# Patient Record
Sex: MALE | Race: WHITE
[De-identification: names, ages, dates, MRNs, and addresses within clinical notes are randomized per-mention and may not be internally consistent; named-entity substitution may affect disease eponyms.]

---

## 2017-06-28 ENCOUNTER — HOSPITAL ENCOUNTER (EMERGENCY)
Dept: HOSPITAL 62 - ER | Age: 57
Discharge: HOME | End: 2017-06-28
Payer: OTHER GOVERNMENT

## 2017-06-28 VITALS — DIASTOLIC BLOOD PRESSURE: 83 MMHG | SYSTOLIC BLOOD PRESSURE: 146 MMHG

## 2017-06-28 DIAGNOSIS — R32: ICD-10-CM

## 2017-06-28 DIAGNOSIS — G89.29: ICD-10-CM

## 2017-06-28 DIAGNOSIS — I10: ICD-10-CM

## 2017-06-28 DIAGNOSIS — M54.5: Primary | ICD-10-CM

## 2017-06-28 LAB
ALBUMIN SERPL-MCNC: 4 G/DL (ref 3.5–5)
ALP SERPL-CCNC: 78 U/L (ref 38–126)
ALT SERPL-CCNC: 21 U/L (ref 21–72)
ANION GAP SERPL CALC-SCNC: 13 MMOL/L (ref 5–19)
APPEARANCE UR: (no result)
AST SERPL-CCNC: 26 U/L (ref 17–59)
BASOPHILS # BLD AUTO: 0.1 10^3/UL (ref 0–0.2)
BASOPHILS NFR BLD AUTO: 0.9 % (ref 0–2)
BILIRUB DIRECT SERPL-MCNC: 0.4 MG/DL (ref 0–0.4)
BILIRUB SERPL-MCNC: 0.9 MG/DL (ref 0.2–1.3)
BILIRUB UR QL STRIP: NEGATIVE
BUN SERPL-MCNC: 15 MG/DL (ref 7–20)
CALCIUM: 9.3 MG/DL (ref 8.4–10.2)
CHLORIDE SERPL-SCNC: 105 MMOL/L (ref 98–107)
CO2 SERPL-SCNC: 24 MMOL/L (ref 22–30)
CREAT SERPL-MCNC: 1.24 MG/DL (ref 0.52–1.25)
EOSINOPHIL # BLD AUTO: 0.1 10^3/UL (ref 0–0.6)
EOSINOPHIL NFR BLD AUTO: 1 % (ref 0–6)
ERYTHROCYTE [DISTWIDTH] IN BLOOD BY AUTOMATED COUNT: 14.4 % (ref 11.5–14)
GLUCOSE SERPL-MCNC: 141 MG/DL (ref 75–110)
GLUCOSE UR STRIP-MCNC: NEGATIVE MG/DL
HCT VFR BLD CALC: 44.5 % (ref 37.9–51)
HGB BLD-MCNC: 15.5 G/DL (ref 13.5–17)
HGB HCT DIFFERENCE: 2
KETONES UR STRIP-MCNC: NEGATIVE MG/DL
LIPASE SERPL-CCNC: 64.4 U/L (ref 23–300)
LYMPHOCYTES # BLD AUTO: 1 10^3/UL (ref 0.5–4.7)
LYMPHOCYTES NFR BLD AUTO: 13.4 % (ref 13–45)
MCH RBC QN AUTO: 30 PG (ref 27–33.4)
MCHC RBC AUTO-ENTMCNC: 34.8 G/DL (ref 32–36)
MCV RBC AUTO: 86 FL (ref 80–97)
MONOCYTES # BLD AUTO: 0.5 10^3/UL (ref 0.1–1.4)
MONOCYTES NFR BLD AUTO: 7 % (ref 3–13)
NEUTROPHILS # BLD AUTO: 5.7 10^3/UL (ref 1.7–8.2)
NEUTS SEG NFR BLD AUTO: 77.7 % (ref 42–78)
NITRITE UR QL STRIP: NEGATIVE
PH UR STRIP: 5 [PH] (ref 5–9)
POTASSIUM SERPL-SCNC: 3.9 MMOL/L (ref 3.6–5)
PROT SERPL-MCNC: 6.4 G/DL (ref 6.3–8.2)
PROT UR STRIP-MCNC: NEGATIVE MG/DL
RBC # BLD AUTO: 5.17 10^6/UL (ref 4.35–5.55)
SODIUM SERPL-SCNC: 141.9 MMOL/L (ref 137–145)
SP GR UR STRIP: 1.02
UROBILINOGEN UR-MCNC: NEGATIVE MG/DL (ref ?–2)
WBC # BLD AUTO: 7.3 10^3/UL (ref 4–10.5)

## 2017-06-28 PROCEDURE — 80053 COMPREHEN METABOLIC PANEL: CPT

## 2017-06-28 PROCEDURE — 51702 INSERT TEMP BLADDER CATH: CPT

## 2017-06-28 PROCEDURE — 72148 MRI LUMBAR SPINE W/O DYE: CPT

## 2017-06-28 PROCEDURE — 87086 URINE CULTURE/COLONY COUNT: CPT

## 2017-06-28 PROCEDURE — 81001 URINALYSIS AUTO W/SCOPE: CPT

## 2017-06-28 PROCEDURE — 36415 COLL VENOUS BLD VENIPUNCTURE: CPT

## 2017-06-28 PROCEDURE — 99284 EMERGENCY DEPT VISIT MOD MDM: CPT

## 2017-06-28 PROCEDURE — 83690 ASSAY OF LIPASE: CPT

## 2017-06-28 PROCEDURE — 85025 COMPLETE CBC W/AUTO DIFF WBC: CPT

## 2017-06-28 NOTE — ER DOCUMENT REPORT
ED General





- General


Chief Complaint: Low Back Pain


Stated Complaint: BACK PAIN


Time Seen by Provider: 06/28/17 15:33


Notes: 


Patient says that he has been feeling weak for the past month.  Also having 

difficulty controlling his urine and "peeing on myself" over that same time.  

And, the patient also has lower back pain.  All of his symptoms have been 

present for about the past month, but have gotten worse in the past few days.  

Patient says he is here today because he is unable to stand and walk normally 

and has not been able to control his urine at all for the past few days.  Does 

not have any numbness in his lower extremities.  Does not have any problems 

with bowel movements.  Has never been told he has any prostate condition.  

Denies any UTI symptoms except for some occasional frequency at nighttime.  Has 

not had any blood in his urine or discomfort urinating.  Patient is not aware 

of any fever.





Patient gets annual upper endoscopies because he says he has "pre-advanced 

cancer" of the throat, but thus far has not required any treatment, just annual 

evaluations.  Also has hypertension.  Denies diabetes.  Patient has patient's 

medications for depression and anxiety.





Patient has chronic low back pain for which she is [facility] 

[facility]


TRAVEL OUTSIDE OF THE [place] IN LAST 30 DAYS: No





- Related Data


Allergies/Adverse Reactions: 


 





zolpidem [From Ambien] Adverse Reaction (Mild, Verified 10/17/16 16:44)


 











Past Medical History





- Social History


Smoking Status: Never Smoker


Chew tobacco use (# tins/day): No


Frequency of alcohol use: None


Drug Abuse: None


Family History: Reviewed & Not Pertinent





- Past Medical History


Cardiac Medical History: Reports: Hx Hypercholesterolemia, Hx Hypertension


Pulmonary Medical History: Reports: Hx Asthma


Psychiatric Medical History: Reports: Hx Anxiety, Hx Depression


Past Surgical History: Reports: Hx Oral Surgery, Hx Orthopedic Surgery - OCL 

repair





- Immunizations


Hx Diphtheria, Pertussis, Tetanus Vaccination: Yes





Review of Systems





- Review of Systems


Notes: 


REVIEW OF SYSTEMS:


CONSTITUTIONAL :  Denies fever.  Feels weak all over, especially in his lower 

extremities.


EENT:   Denies eye, ear, nose or mouth or throat pain or other symptoms.


CARDIOVASCULAR:  Denies chest pain.


RESPIRATORY:  Denies cough, chest congestion, or shortness of breath.


GASTROINTESTINAL:  Denies abdominal pain or nausea, vomiting, or diarrhea.  No 

problems controlling bowel movements.


GENITOURINARY: See HPI.


MUSCULOSKELETAL:  Denies neck pain but has some lower lumbar back pain, 

bilateral..  Denies joint pain or swelling.


SKIN:   Denies rash or skin lesions.


NEUROLOGICAL:  Denies LOC or altered mental status.  Denies headache.  Denies 

sensory loss, no numbness lower extremities. 





ALL OTHER SYSTEMS REVIEWED AND NEGATIVE.





Physical Exam





- Vital signs


Vitals: 


 











Temp Pulse Resp BP Pulse Ox


 


 98.3 F   102 H  16   146/82 H  95 


 


 06/28/17 15:25  06/28/17 15:25  06/28/17 15:25  06/28/17 15:25  06/28/17 15:25











Interpretation: Normal





- Notes


Notes: 


PHYSICAL EXAMINATION:





GENERAL: Well-appearing, in no acute distress.  Ambulatory, although slow and 

deliberate.





HEAD: Atraumatic, normocephalic.





ENT: oropharynx clear without exudates.  Moist mucous membranes.





NECK: Normal range of motion, supple.





LUNGS: Breath sounds clear and equal bilaterally.





HEART: Regular rate and rhythm without murmurs.





ABDOMEN: Soft, nontender.  No guarding or rebound.  No masses felt.  No bruits 

heard.


     Patient's initial Oconnell catheter produced about 50 mL of clean-looking 

urine.  He now has about 70 mL in the bag and tubing so he is making an 

appropriate amount of urine and no evidence of infection or urinary retention.





BACK: Mild paralumbar muscle tenderness, but otherwise no tenderness throughout 

entire remaining back.





EXTREMITIES: Normal range of motion without pain.





NEUROLOGICAL:  Normal speech, normal gait although slow and deliberate.  Can 

maintain his balance, make a turn and get back in the stretcher.  Reflexes +1 

bilateral and symmetrical awake, alert, and oriented x3.  Cranial nerves normal.





PSYCH: Normal mood, normal affect.





SKIN: Warm, dry, no rashes.





Course





- Re-evaluation


Re-evalutation: 





06/28/17 19:53


Labs are all essentially normal.  No evidence of a urine infection.  Patient 

had a Oconnell catheter placed and had a small amount of urine in his bladder.  

Urine looks clean.  Urinalysis is normal.





- Vital Signs


Vital signs: 


 











Temp Pulse Resp BP Pulse Ox


 


 97.3 F   92   19   146/83 H  94 


 


 06/28/17 22:09  06/28/17 22:09  06/28/17 22:09  06/28/17 22:09  06/28/17 22:09














- Laboratory


Result Diagrams: 


 06/28/17 16:17





 06/28/17 16:17


Laboratory results interpreted by me: 


 











  06/28/17 06/28/17





  16:17 16:17


 


RDW  14.4 H 


 


Glucose   141 H














- Diagnostic Test


Radiology reviewed: Image reviewed, Reports reviewed - MRI of the lumbar spine 

shows degenerative and arthritic changes.  All of these findings are of a mild 

to moderate degree.  None are severe.  Patient has no evidence of spinal 

stenosis.





Discharge





- Discharge


Clinical Impression: 


Back pain


Qualifiers:


 Back pain location: low back pain Chronicity: unspecified Back pain laterality

: bilateral Sciatica presence: without sciatica Qualified Code(s): M54.5 - Low 

back pain





Urinary incontinence


Qualifiers:


 Urinary Incontinence type: unspecified incontinence Qualified Code(s): R32 - 

Unspecified urinary incontinence





Condition: Stable


Disposition: HOME, SELF-CARE


Additional Instructions: 


OW BACK PAIN:


     Three out of every four people will have an episode of disabling back pain 

during their lifetime.  Most commonly the pain is due to straining of the 

muscles and ligaments in the low back.


     Usual treatment includes: 


(1) Rest on a firm surface.  Avoid lying on your stomach.  


(2) Ice pack the painful area.  After a few days, gentle heat may be used 

intermittently to relax the area, or ice packs can be continued.  


(3) Medication may be needed -- muscle relaxers and antiinflammatory medicines 

are commonly used.  


(4) As the back improves, exercises are prescribed to strengthen the back and 

abdominal muscles.


     Your doctor will advise you on the proper care for your back at each stage 

in your recovery.  You may be better in a few days -- or healing may take 

several weeks.


     If new symptoms of a "herniated disc" (radiation of pain, numbness, or 

tingling down the back of the leg or weakness in the leg) occur, you should be 

re-examined.  Further testing may be necessary.





WARM PACKS:


     After approximately two days, apply gentle heat (such as a heating pad or 

hot water bottle) for about 20 to 30 minutes about every two hours -- at least 

four times daily.  Warmth and elevation will help you make a more rapid recovery

, and will ease the pain considerably.


     Do not use HOT heat, and never apply heat for longer than 30 minutes.  The 

continuous heat can invisibly damage skin and muscles -- even when no burn is 

seen on the surface.  Damaged muscles can make you MORE sore.





USE OF ACETAMINOPHEN (Tylenol):


     Acetaminophen may be taken for pain relief or fever control. It's much 

safer than aspirin, offering a wider range of "safe" dosages.  It is safe 

during pregnancy.  Some brand names are Tylenol, Panadol, Datril, Anacin 3, 

Tempra, and Liquiprin. Acetaminophen can be repeated every four hours.  The 

following are maximum recommended dosages:





WEIGHT         Dose             Drops                  Elixir        Chewable(

80mg)


(LBS.)                            drprs=droppers    tsp=teaspoon


>89 pounds or adults          650 mg to 900 mg





Acetaminophen can be repeated every four hours.  Maximum dose not to exceed 

4000 mg a day.





   These maximum recommended dosages are slightly higher than the dosages 

written on the product container, but these dosages are very safe and below the 

toxic dosage for acetaminophen.





Urinary Incontinence


     Urinary incontinence is unexpected leakage of urine from the bladder. It 

can be caused by damaged or weak pelvic muscles (such as after childbirth), 

bladder inflammation, weak bladder sphincter (valve), medications, prostate 

problems, and nerve problems. This is a common problem, especially in our older 

patients. 


     Treatment of incontinence depends on the severity, and on the underlying 

cause. We test for urinary tract infection and examine for prostate enlargement 

or prolapse (sagging down) of the bladder and uterus. If we suspect a medicine 

may be contributing to the problem, we may stop it or lower the dosage.


     If the problem can't be corrected with medication or surgery, you may need 

to use absorbent underwear. For men, a "condom catheter" over the penis can 

collect urine. It's sometimes necessary to keep a catheter inside the bladder.


     The following are different types of urinary incontinence.


     Stress incontinence: A sudden burst of urine with cough or sneeze. This is 

common in women with multiple children. Pelvic muscle (Kegel) exercises may 

help. An incontinence pad catches the occasional accident. If severe, an 

operation to suspend of the bladder may correct the problem.


     Overflow incontinence: Leakage from an over-filled bladder. This type of 

incontinence is usually caused by an enlarged prostate or narrowed urethra. 

Prostatic obstruction can be treated with medication. Severe cases may require 

prostate surgery.


     Neurogenic bladder: Sudden emptying of the bladder due to injury or 

disease of the nerves that control the bladder, or overflow caused by lack of 

bladder contraction (atonic bladder). This usually requires a catheter, or the 

wearing of incontinence undergarments. A weak (atonic) bladder sometimes 

responds to medicine such as bethanechol (Urecholine).


     Overactive bladder: The bladder is irritable and tightens when it's not 

full. The sudden urge to urinate makes it hard to avoid passage of urine. This 

can often be treated with medication such as oxybutynin.


     Sphincter atony: The muscle that holds urine in the bladder is weak. This 

often causes incontinence at night. Medication such as imipramine or 

psuedoephedrine can sometimes help.





FOLLOW-UP CARE:


If you have been referred to a physician for follow-up care, call the physician

s office for an appointment as you were instructed or within the next two days.

  If you experience worsening or a significant change in your symptoms, notify 

the physician immediately or return to the Emergency Department at any time for 

re-evaluation.





[facility] the [facility] for a referral to a urologist if you continue to 

have your symptoms of poor bladder control.

## 2017-06-28 NOTE — RADIOLOGY REPORT (SQ)
EXAM DESCRIPTION:  MRI LUMBAR SPINE WITHOUT



COMPLETED DATE/TIME:  6/28/2017 9:38 pm



REASON FOR STUDY:  Urinary incontinence and weakness in legs



COMPARISON:  None.



TECHNIQUE:  Sagittal and Axial imaging includes T1, T2, STIR and gradient echo sequences. Coronal T2/
HASTE imaging.



LIMITATIONS:  None.



FINDINGS:  VISUALIZED UPPER ABDOMEN:  Limited evaluation. No acute or suspicious findings suggested.

SEGMENTATION: No transitional anatomy. The lowest well-developed disc space is labeled L5-S1.

ALIGNMENT: Anatomic.

VERTEBRAE: Intact.

BONE MARROW: Normal. No marrow replacement or reactive changes.

DISC SIGNAL: Loss of height T2 signal L1- 2, L2-3, L5-S1.  Loss of T2 signal L4-5.

POSTERIOR ELEMENTS:  Generally intact.  No pars defect evident.

HARDWARE: None in the spine.

CORD AND CONUS: Normal in size and signal intensity. Conus at the appropriate level.

SOFT TISSUES: No aortic aneurysm seen. No bulky retroperitoneal adenopathy or mass. No paraspinal mas
s or fluid.

L1-L2: Disc bulge asymmetric left with mild narrowing of the left exit foramina.

L2-L3: Generalized disc bulge with mild narrowing of the exit foramina.

L3-L4: Generalized disc bulge with mild narrowing of the exit foramina.

L4-L5: Central disc protrusion.  Mild facet ligamentous hypertrophy.  Moderate narrowing of the left 
exit foramina and moderate central canal stenosis.  Mild narrowing of the left exit foramina.

L5-S1: Generalized disc bulge with central protrusion.  Moderate narrowing of the exit foramina.

LOWER THORACIC: Incompletely imaged.  No stenosis seen.

SACRUM: Visualized upper sacrum intact.

OTHER: No other significant findings.



IMPRESSION:  Multilevel spondylosis.  At L4-5 there is moderate narrowing of the left exit foramina a
nd moderate central canal stenosis.  At L5-S1 there is moderate of the exit foramina.



TECHNICAL DOCUMENTATION:  JOB ID:  [id number]

 [employer]- All Rights Reserved

## 2019-03-28 ENCOUNTER — HOSPITAL ENCOUNTER (OUTPATIENT)
Dept: HOSPITAL 62 - ER | Age: 59
Setting detail: OBSERVATION
LOS: 2 days | Discharge: HOME | End: 2019-03-30
Attending: INTERNAL MEDICINE | Admitting: INTERNAL MEDICINE
Payer: OTHER GOVERNMENT

## 2019-03-28 DIAGNOSIS — R42: ICD-10-CM

## 2019-03-28 DIAGNOSIS — J45.909: ICD-10-CM

## 2019-03-28 DIAGNOSIS — F41.9: ICD-10-CM
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
## 2019-03-28 DIAGNOSIS — R55: Primary | ICD-10-CM

## 2019-03-28 DIAGNOSIS — Z79.82: ICD-10-CM

## 2019-03-28 DIAGNOSIS — F32.9: ICD-10-CM

## 2019-03-28 DIAGNOSIS — Z79.899: ICD-10-CM

## 2019-03-28 DIAGNOSIS — S01.81XA: ICD-10-CM

## 2019-03-28 DIAGNOSIS — E78.5: ICD-10-CM

## 2019-03-28 DIAGNOSIS — Z87.898: ICD-10-CM

## 2019-03-28 LAB
ADD MANUAL DIFF: NO
ALBUMIN SERPL-MCNC: 4.3 G/DL (ref 3.5–5)
ALP SERPL-CCNC: 180 U/L (ref 38–126)
ALT SERPL-CCNC: 27 U/L (ref 21–72)
ANION GAP SERPL CALC-SCNC: 16 MMOL/L (ref 5–19)
AST SERPL-CCNC: 32 U/L (ref 17–59)
BARBITURATES UR QL SCN: NEGATIVE
BASOPHILS # BLD AUTO: 0.1 10^3/UL (ref 0–0.2)
BASOPHILS NFR BLD AUTO: 1.1 % (ref 0–2)
BILIRUB DIRECT SERPL-MCNC: 0.5 MG/DL (ref 0–0.4)
BILIRUB SERPL-MCNC: 1 MG/DL (ref 0.2–1.3)
BUN SERPL-MCNC: 10 MG/DL (ref 7–20)
CALCIUM: 9.9 MG/DL (ref 8.4–10.2)
CHLORIDE SERPL-SCNC: 101 MMOL/L (ref 98–107)
CK MB SERPL-MCNC: 3.25 NG/ML (ref ?–4.55)
CK SERPL-CCNC: 115 U/L (ref 55–170)
CO2 SERPL-SCNC: 20 MMOL/L (ref 22–30)
EOSINOPHIL # BLD AUTO: 0 10^3/UL (ref 0–0.6)
EOSINOPHIL NFR BLD AUTO: 0.3 % (ref 0–6)
ERYTHROCYTE [DISTWIDTH] IN BLOOD BY AUTOMATED COUNT: 14 % (ref 11.5–14)
GLUCOSE SERPL-MCNC: 93 MG/DL (ref 75–110)
HCT VFR BLD CALC: 51.1 % (ref 37.9–51)
HGB BLD-MCNC: 17.7 G/DL (ref 13.5–17)
INR PPP: 0.99
LYMPHOCYTES # BLD AUTO: 1.1 10^3/UL (ref 0.5–4.7)
LYMPHOCYTES NFR BLD AUTO: 12.3 % (ref 13–45)
MCH RBC QN AUTO: 29.2 PG (ref 27–33.4)
MCHC RBC AUTO-ENTMCNC: 34.6 G/DL (ref 32–36)
MCV RBC AUTO: 84 FL (ref 80–97)
METHADONE UR QL SCN: NEGATIVE
MONOCYTES # BLD AUTO: 0.6 10^3/UL (ref 0.1–1.4)
MONOCYTES NFR BLD AUTO: 6.7 % (ref 3–13)
NEUTROPHILS # BLD AUTO: 7 10^3/UL (ref 1.7–8.2)
NEUTS SEG NFR BLD AUTO: 79.6 % (ref 42–78)
PCP UR QL SCN: NEGATIVE
PLATELET # BLD: 325 10^3/UL (ref 150–450)
POTASSIUM SERPL-SCNC: 3.5 MMOL/L (ref 3.6–5)
PROT SERPL-MCNC: 7.2 G/DL (ref 6.3–8.2)
PROTHROMBIN TIME: 13.6 SEC (ref 11.4–15.4)
RBC # BLD AUTO: 6.06 10^6/UL (ref 4.35–5.55)
SODIUM SERPL-SCNC: 136.9 MMOL/L (ref 137–145)
TOTAL CELLS COUNTED % (AUTO): 100 %
TROPONIN I SERPL-MCNC: < 0.012 NG/ML
URINE AMPHETAMINES SCREEN: NEGATIVE
URINE BENZODIAZEPINES SCREEN: NEGATIVE
URINE COCAINE SCREEN: NEGATIVE
URINE MARIJUANA (THC) SCREEN: NEGATIVE
WBC # BLD AUTO: 8.8 10^3/UL (ref 4–10.5)

## 2019-03-28 PROCEDURE — 80053 COMPREHEN METABOLIC PANEL: CPT

## 2019-03-28 PROCEDURE — 93010 ELECTROCARDIOGRAM REPORT: CPT

## 2019-03-28 PROCEDURE — 82553 CREATINE MB FRACTION: CPT

## 2019-03-28 PROCEDURE — 84484 ASSAY OF TROPONIN QUANT: CPT

## 2019-03-28 PROCEDURE — 93005 ELECTROCARDIOGRAM TRACING: CPT

## 2019-03-28 PROCEDURE — 85025 COMPLETE CBC W/AUTO DIFF WBC: CPT

## 2019-03-28 PROCEDURE — 82550 ASSAY OF CK (CPK): CPT

## 2019-03-28 PROCEDURE — 72125 CT NECK SPINE W/O DYE: CPT

## 2019-03-28 PROCEDURE — 85610 PROTHROMBIN TIME: CPT

## 2019-03-28 PROCEDURE — 82962 GLUCOSE BLOOD TEST: CPT

## 2019-03-28 PROCEDURE — G0378 HOSPITAL OBSERVATION PER HR: HCPCS

## 2019-03-28 PROCEDURE — 93880 EXTRACRANIAL BILAT STUDY: CPT

## 2019-03-28 PROCEDURE — 36415 COLL VENOUS BLD VENIPUNCTURE: CPT

## 2019-03-28 PROCEDURE — 80307 DRUG TEST PRSMV CHEM ANLYZR: CPT

## 2019-03-28 PROCEDURE — 71045 X-RAY EXAM CHEST 1 VIEW: CPT

## 2019-03-28 PROCEDURE — 83735 ASSAY OF MAGNESIUM: CPT

## 2019-03-28 PROCEDURE — 70450 CT HEAD/BRAIN W/O DYE: CPT

## 2019-03-28 PROCEDURE — 99285 EMERGENCY DEPT VISIT HI MDM: CPT

## 2019-03-28 NOTE — RADIOLOGY REPORT (SQ)
EXAM DESCRIPTION:  CT HEAD WITHOUT



COMPLETED DATE/TIME:  3/28/2019 6:41 pm



REASON FOR STUDY:  fall, head injury loc



COMPARISON:  None.



TECHNIQUE:  Axial images acquired through the brain without intravenous contrast.  Images reviewed wi
th bone, brain and subdural windows.   Images stored on PACS.

All CT scanners at this facility use dose modulation, iterative reconstruction, and/or weight based d
osing when appropriate to reduce radiation dose to as low as reasonably achievable (ALARA).

[facility]: Dose Right  [facility]: CareDose    [facility]: Dose Right    [facility]: Teradose 4D    [facility]: Smart twtr[place]



RADIATION DOSE:  CT Rad equipment meets quality standard of care and radiation dose reduction techniq
ues were employed. CTDIvol: 64.6 mGy. DLP: 1292 mGy-cm. mGy.



LIMITATIONS:  None.



FINDINGS:  VENTRICLES: Normal size and contour.

CEREBRUM: No masses.  No hemorrhage.  No midline shift.  No evidence for acute infarction. Normal gra
y/white matter differentiation. No areas of low density in the white matter.

CEREBELLUM: No masses.  No hemorrhage.  No alteration of density.  No evidence for acute infarction.

EXTRAAXIAL SPACES: No fluid collections.  No masses.

ORBITS AND GLOBE: No intra- or extraconal masses.  Normal contour of globe without masses.

CALVARIUM: No fracture.

PARANASAL SINUSES: No fluid or mucosal thickening.

SOFT TISSUES: No mass or hematoma.

OTHER: No other significant finding.



IMPRESSION:  NORMAL BRAIN CT WITHOUT CONTRAST.

EVIDENCE OF ACUTE STROKE: NO.



COMMENT:  Quality ID # 436: Final reports with documentation of one or more dose reduction techniques
 (e.g., Automated exposure control, adjustment of the mA and/or kV according to patient size, use of 
iterative reconstruction technique)



TECHNICAL DOCUMENTATION:  JOB ID:  [id number]

 2011 [employer]- All Rights Reserved



Reading location - IP/workstation name: [email or web address]

## 2019-03-28 NOTE — RADIOLOGY REPORT (SQ)
EXAM DESCRIPTION:  CHEST SINGLE VIEW



COMPLETED DATE/TIME:  3/28/2019 6:40 pm



REASON FOR STUDY:  syncope



COMPARISON:  10/17/2016



EXAM PARAMETERS:  NUMBER OF VIEWS: One view.

TECHNIQUE: Single frontal radiographic view of the chest acquired.

RADIATION DOSE: NA

LIMITATIONS: None.



FINDINGS:  LUNGS AND PLEURA: No opacities, masses or pneumothorax. No pleural effusion.

MEDIASTINUM AND HILAR STRUCTURES: No masses.  Contour normal.

HEART AND VASCULAR STRUCTURES: Heart normal in size.  Normal vasculature.

BONES: No acute findings.

HARDWARE: None in the chest.

OTHER: No other significant finding.



IMPRESSION:  NO ACUTE RADIOGRAPHIC FINDING IN THE CHEST.



TECHNICAL DOCUMENTATION:  JOB ID:  [id number]

 2011 [employer]- All Rights Reserved



Reading location - IP/workstation name: [email or web address]

## 2019-03-28 NOTE — EKG REPORT
SEVERITY:- ABNORMAL ECG -

SINUS RHYTHM

LEFT ANTERIOR FASCICULAR BLOCK

CONSIDER ANTEROSEPTAL INFARCT

:

Confirmed by: Aimee Montalvo MD 28-Mar-2019 20:14:03

## 2019-03-28 NOTE — ER DOCUMENT REPORT
ED General





- General


Chief Complaint: Syncope


Stated Complaint: FALL


Time Seen by Provider: 03/28/19 18:14


Mode of Arrival: Stretcher


Information source: Patient


Notes: 





This is a 58-year-old man with a history of asthma, dyslipidemia, essential 

tremor who was brought into the emergency room after a syncopal episode.  

Patient does not remember the event.  He apparently fell and hit his head.


TRAVEL OUTSIDE OF THE [place] IN LAST 30 DAYS: No





- HPI


Onset: Just prior to arrival


Onset/Duration: Sudden


Quality of pain: No pain


Severity: None


Pain Level: Denies


Associated symptoms: denies: Chest pain, Fever, Shortness of breath


Exacerbated by: Denies


Relieved by: Denies


Similar symptoms previously: No


Recently seen / treated by doctor: No





- Related Data


Allergies/Adverse Reactions: 


                                        





zolpidem [From Ambien] Adverse Reaction (Mild, Verified 10/17/16 16:44)


   











Past Medical History





- General


Information source: Patient





- Social History


Smoking Status: Unknown if Ever Smoked


Cigarette use (# per day): No


Chew tobacco use (# tins/day): No


Frequency of alcohol use: None


Drug Abuse: None


Lives with: Alone


Family History: Reviewed & Not Pertinent


Patient has suicidal ideation: No


Patient has homicidal ideation: No





- Past Medical History


Cardiac Medical History: Reports: Hx Hypercholesterolemia, Hx Hypertension


   Denies: Hx Heart Attack


Pulmonary Medical History: Reports: Hx Asthma


Neurological Medical History: Denies: Hx Cerebrovascular Accident, Hx Seizures


Renal/ Medical History: Denies: Hx Peritoneal Dialysis


GI Medical History: Denies: Hx Hepatitis, Hx Hiatal Hernia, Hx Ulcer


Psychiatric Medical History: Reports: Hx Anxiety, Hx Depression


Infectious Medical History: Denies: Hx Hepatitis


Past Surgical History: Reports: Hx Oral Surgery, Hx Orthopedic Surgery - OCL 

repair, Other - Anal fissure.  Denies: Hx Open Heart Surgery, Hx Pacemaker





- Immunizations


Hx Diphtheria, Pertussis, Tetanus Vaccination: Yes





Review of Systems





- Review of Systems


Constitutional: denies: Chills, Fever


EENT: No symptoms reported


Cardiovascular: See HPI


Respiratory: No symptoms reported


Gastrointestinal: No symptoms reported


Genitourinary: No symptoms reported


Male Genitourinary: No symptoms reported


Musculoskeletal: No symptoms reported


Skin: No symptoms reported


Hematologic/Lymphatic: No symptoms reported


Neurological/Psychological: See HPI





Physical Exam





- Vital signs


Vitals: 


                                        











Temp Pulse Resp BP


 


 97.9 F   100   16   148/100 H


 


 03/28/19 18:13  03/28/19 18:13  03/28/19 18:13  03/28/19 18:13











Notes: 





Physical exam:


 


GENERAL: Patient is alert and oriented x3, no acute distress.





HEAD: She does have abrasions to the left side of the temporal region as well as

the left cheek, normocephalic.





EYES: Pupils equal round and reactive to light, extraocular movements intact, 

sclera anicteric, conjunctiva are normal.





ENT: TMs normal, nares patent, oropharynx clear without exudates.  Moist mucous 

membranes.





NECK: Normal range of motion, supple without obvious mass or JVD.





LUNGS: Breath sounds clear to auscultation bilaterally and equal.  No wheezes 

rales or rhonchi.





HEART: Regular rate and rhythm without murmurs, rubs or gallops.





ABDOMEN: Soft, normoactive bowel sounds.  No tenderness to palpation.  No 

guarding, no rebound.  No masses appreciated.





EXTREMITIES: Normal range of motion, no pitting or edema.  No clubbing or 

cyanosis.





NEUROLOGICAL: Cranial nerves II through XII grossly intact.  Normal speech, 

moving all extremities.





PSYCH: Normal mood, normal affect.





SKIN: Warm, Dry, normal turgor, no rashes or lesions noted.





Course





- Vital Signs


Vital signs: 


                                        











Temp Pulse Resp BP Pulse Ox


 


 97.9 F   100   20   146/99 H  97 


 


 03/28/19 18:13  03/28/19 18:13  03/29/19 01:01  03/29/19 01:00  03/29/19 01:01














- Laboratory


Result Diagrams: 


                                 03/28/19 18:06





                                 03/28/19 18:06


Laboratory results interpreted by me: 


                                        











  03/28/19 03/28/19 03/28/19





  17:59 18:06 18:06


 


RBC   6.06 H 


 


Hgb   17.7 H 


 


Hct   51.1 H 


 


Seg Neutrophils %   79.6 H 


 


Lymphocytes %   12.3 L 


 


Sodium    136.9 L


 


Potassium    3.5 L


 


Carbon Dioxide    20 L


 


POC Glucose  125 H  


 


Direct Bilirubin    0.5 H


 


Alkaline Phosphatase    180 H














- Diagnostic Test


Radiology reviewed: Image reviewed, Reports reviewed - CT of the head, cervical 

spine shows no acute pathology.  Chest x-ray is clear





- EKG Interpretation by Me


Rate: Normal


Rhythm: NSR - EKG shows normal sinus rhythm with a ventral of the rate of 94, 

poor R wave progression, no acute ST-T wave changes





Discharge





- Discharge


Clinical Impression: 


Syncope


Qualifiers:


 Encounter type: initial encounter 





Condition: Stable


Disposition: ADMITTED AS OBSERVATION


Admitting Provider: Hospitalist


Unit Admitted: Telemetry

## 2019-03-28 NOTE — RADIOLOGY REPORT (SQ)
EXAM DESCRIPTION:  CT CERVICAL SPINE WITHOUT



COMPLETED DATE/TIME:  3/28/2019 6:41 pm



REASON FOR STUDY:  fall



COMPARISON:  None.



TECHNIQUE:  Axial images acquired through the cervical spine without intravenous contrast.  Images re
viewed with lung, soft tissue and bone windows.  Reconstructed coronal and sagittal MPR images review
ed.  Images stored on PACS.

All CT scanners at this facility use dose modulation, iterative reconstruction, and/or weight based d
osing when appropriate to reduce radiation dose to as low as reasonably achievable (ALARA).

[facility]: Dose Right  [facility]: CareDose    [facility]: Dose Right    [facility]: Teradose 4D    [facility]: Smart Technologies



RADIATION DOSE:  CT Rad equipment meets quality standard of care and radiation dose reduction techniq
ues were employed. CTDIvol: 18.9 mGy. DLP: 445 mGy-cm. mGy.



LIMITATIONS:  None.



FINDINGS:  ALIGNMENT: Anatomic.

MINERALIZATION: Normal.

VERTEBRAL BODIES: No fractures or dislocation.

DISCS: Multilevel disc space narrowing with osteophytes.

FACETS, LATERAL MASSES, POSTERIOR ELEMENTS: Facet arthropathy.  No fractures.  No dislocation.  No ac
Telida findings.

HARDWARE: None in the spine.

VISUALIZED RIBS: No fractures.

LUNG APICES AND SOFT TISSUES: No significant or acute findings.

OTHER: No other significant finding.



IMPRESSION:  CHRONIC DEGENERATIVE CHANGES.  NO ACUTE FINDINGS.



TECHNICAL DOCUMENTATION:  JOB ID:  [id number]

Quality ID # 436: Final reports with documentation of one or more dose reduction techniques (e.g., Au
tomated exposure control, adjustment of the mA and/or kV according to patient size, use of iterative 
reconstruction technique)

 2011 [employer]- All Rights Reserved



Reading location - IP/workstation name: [email or web address]

## 2019-03-29 RX ADMIN — HEPARIN SODIUM SCH UNIT: 5000 INJECTION, SOLUTION INTRAVENOUS; SUBCUTANEOUS at 21:37

## 2019-03-29 RX ADMIN — HEPARIN SODIUM SCH: 5000 INJECTION, SOLUTION INTRAVENOUS; SUBCUTANEOUS at 14:14

## 2019-03-29 RX ADMIN — SODIUM CHLORIDE PRN MLS/HR: 9 INJECTION, SOLUTION INTRAVENOUS at 11:29

## 2019-03-29 RX ADMIN — HEPARIN SODIUM SCH UNIT: 5000 INJECTION, SOLUTION INTRAVENOUS; SUBCUTANEOUS at 05:52

## 2019-03-29 NOTE — PDOC CONSULTATION
History of Present Illness


Admission Date/PCP: 


  03/28/19 22:03





  [facility]





Patient complains of: Asked by hospitalist to evaluate half an inch laceration 

of the patient's left lower jaw


History of Present Illness: 


YADI GEORGE is a 58 year old male status post fall with resultant small 

laceration in his left lower jaw.  The fall occurred yesterday.





Past Medical History


Cardiac Medical History: Reports: Hyperlipidema, Hypertension


   Denies: Myocardial Infarction


Pulmonary Medical History: Reports: Asthma


Neurological Medical History: 


   Denies: Seizures


GI Medical History: 


   Denies: Hepatitis, Hiatal Hernia


Psychiatric Medical History: Reports: Depression


Hematology: 


   Denies: Anemia, Sickle Cell Disease





Past Surgical History


Past Surgical History: Reports: Orthopedic Surgery - OCL repair, Other - Anal 

fissure


   Denies: Pacemaker





Social History


Lives with: Alone


Smoking Status: Never Smoker


Hx Recreational Drug Use: No


Hx Prescription Drug Abuse: No





- Advance Directive


Resuscitation Status: Full Code





Family History


Family History: None


Parental Family History Reviewed: No


Children Family History Reviewed: No


Sibling(s) Family History Reviewed.: No





Medication/Allergy


Home Medications: 








Aspirin [Aspirin 81 mg Chewable Tablet] 81 mg PO DAILY 03/27/12 


Lisinopril/Hydrochlorothiazide [Zestoretic 20-25 Mg Tablet] 1 each PO DAILY 

03/27/12 


Metoprolol Tartrate [Lopressor 50 mg Tablet] 50 mg PO Q12H 03/27/12 


Multivitamin [Vitamin A Day] 1 each PO DAILY 03/27/12 


Niacin (Inositol Niacinate) [Niacin 500 Mg Capsule] 1 each PO DAILY 03/27/12 


Omega-3 Fatty Acids/Fish Oil [Fish Oil 1,000 Mg Capsule] 1 each PO DAILY 

03/27/12 


Pravastatin Sodium 20 mg PO QHS 03/27/12 


Bupropion HCl [Wellbutrin Xl] 150 mg PO BID 06/10/13 


Clonazepam [Klonopin 1 Mg Tablet] 1 mg PO BID PRN 06/10/13 


Sildenafil Citrate [Sildenafil] 20 mg PO PRN 06/10/13 


Venlafaxine HCl [Venlafaxine HCl ER] 37.5 mg PO ASDIR PRN 06/10/13 


Trazodone HCl 150 mg PO QHS 08/06/14 


Sulfamethoxazole/Trimethoprim [Bactrim Ds Tablet] 1 each PO BID #14 tablet 

02/13/15 


Hydromorphone HCl [Dilaudid 2 Mg Tablet] 2 mg PO Q4HP PRN #10 tablet 04/05/15 


Docusate Sodium [Colace 100 mg Capsule] 100 mg PO DAILY #30 capsule 04/15/15 


Oxycodone HCl 5 mg PO Q6 #30 capsule 04/15/15 


Hydrocodone/Acetaminophen [Norco 5-325 mg Tablet] 1 tab PO Q6 #14 tablet 

08/03/15 


Butalb/Acetaminophen/Caffeine [Fioricet -40 mg Capsule] 1 cap PO Q4 PRN 

#20 cap 10/24/15 








Allergies/Adverse Reactions: 


                                        





zolpidem [From Ambien] Adverse Reaction (Mild, Verified 10/17/16 16:44)


   











Physical Exam


Vital Signs: 


                                        











Temp Pulse Resp BP Pulse Ox


 


 98.2 F   92   16   181/111 H  99 


 


 03/29/19 03:01  03/29/19 03:55  03/29/19 03:01  03/29/19 03:55  03/29/19 03:01








                                 Intake & Output











 03/27/19 03/28/19 03/29/19





 06:59 06:59 06:59


 


Weight   76.6 kg











General appearance: PRESENT: disheveled


Skin exam: PRESENT: other - Thickly bearded patient with a PARTIAL thickness 

skin laceration about half an inch long at his left lower jaw that appears 

clean.  No subcutaneous fat exposed.





Results


Laboratory Results: 


                                        





                                 03/28/19 18:06 





                                 03/28/19 18:06 





                                        











  03/28/19 03/28/19 03/28/19





  18:06 18:06 18:06


 


WBC  8.8  


 


RBC  6.06 H  


 


Hgb  17.7 H  


 


Hct  51.1 H  


 


MCV  84  


 


MCH  29.2  


 


MCHC  34.6  


 


RDW  14.0  


 


Plt Count  325  


 


Seg Neutrophils %  79.6 H  


 


Lymphocytes %  12.3 L  


 


Monocytes %  6.7  


 


Eosinophils %  0.3  


 


Basophils %  1.1  


 


Absolute Neutrophils  7.0  


 


Absolute Lymphocytes  1.1  


 


Absolute Monocytes  0.6  


 


Absolute Eosinophils  0.0  


 


Absolute Basophils  0.1  


 


Sodium   136.9 L 


 


Potassium   3.5 L 


 


Chloride   101 


 


Carbon Dioxide   20 L 


 


Anion Gap   16 


 


BUN   10 


 


Creatinine   1.23 


 


Est GFR ( Amer)   > 60 


 


Est GFR (Non-Af Amer)   > 60 


 


Glucose   93 


 


Calcium   9.9 


 


Magnesium    2.0


 


Total Bilirubin   1.0 


 


AST   32 


 


ALT   27 


 


Alkaline Phosphatase   180 H 


 


Total Protein   7.2 


 


Albumin   4.3 








                                        











  03/28/19 03/28/19 03/28/19





  18:06 18:06 23:20


 


Creatine Kinase  115  


 


CK-MB (CK-2)   3.25 


 


Troponin I   < 0.012  < 0.012











Impressions: 


                                        





Cervical Spine CT  03/28/19 18:22


IMPRESSION:  CHRONIC DEGENERATIVE CHANGES.  NO ACUTE FINDINGS.


 








Chest X-Ray  03/28/19 18:22


IMPRESSION:  NO ACUTE RADIOGRAPHIC FINDING IN THE CHEST.


 








Head CT  03/28/19 18:22


IMPRESSION:  NORMAL BRAIN CT WITHOUT CONTRAST.


EVIDENCE OF ACUTE STROKE: NO.


 














Assessment & Plan





- Diagnosis


(1) Partial thickness skin laceration


Is this a current diagnosis for this admission?: Yes   


Plan: 


Partial thickness small skin laceration on the jaw.    Partial thickness small 

skin lacerations does not need surgical intervention unless it is a cosmetic 

issue.  Would not be a cosmetic issue in this thickly bearded patient.   

Furthermore, suture closure of such a laceration this many hours after injury 

would actually increase risk of infection.  Recommend bacitracin ointment twice 

a day.

## 2019-03-29 NOTE — RADIOLOGY REPORT (SQ)
EXAM DESCRIPTION:  CAROTID DOPPLER



COMPLETED DATE/TIME:  3/29/2019 5:03 pm



REASON FOR STUDY:  syncope



COMPARISON:  None.



TECHNIQUE:  Grayscale ultrasound, Doppler velocity and spectra, and color Doppler images acquired of 
the extra-cranial carotid and vertebral arteries. Images stored on PACS.



LIMITATIONS:  None.



FINDINGS:  RIGHT CAROTID

CCA Velocities: Within normal limits.

ICA Velocities

Peak systolic 71 cm/s.

End diastolic 21 cm/s.

Proximal ICA/CCA peak systolic ratio 0.6.

Spectra normal. No significant plaque.

LEFT CAROTID

CCA Velocities: Within normal limits.

ICA Velocities

Peak systolic 67 cm/s.

End diastolic 17 cm/s.

Proximal ICA/CCA peak systolic ratio 0.8.

Spectra normal. No significant plaque.

VERTEBRAL ARTERIES: Antegrade flow.  Normal waveforms.

SUBCLAVIAN ARTERIES: No finding.

OTHER: No other significant finding.



IMPRESSION:  NO HEMODYNAMICALLY SIGNIFICANT STENOSIS.



COMMENT:  Quality ID #195:  Velocity criteria are extrapolated from the diameter data as defined by t
he Society of Radiologists in Ultrasound Consensus Conference. Radiology 2003: 229; 340-346.



TECHNICAL DOCUMENTATION:  JOB ID:  [id number]

 2011 "[employer]"- All Rights Reserved



Reading location - IP/workstation name: [email or web address]

## 2019-03-29 NOTE — PDOC PROGRESS REPORT
Subjective


Progress Note for:: 03/29/19


Subjective:: 


This is a 58 yr old male with a PMH of asthma, dyslipidemia, essential tremor 

and polypharmacy who preented with fall and syncope. He was admitted for a 

syncope work-up. He also sustained laceration on the head form the fall.





No acute event overnight. This morning, he says he says he feels dizzy and 

lightheaded when he walks. His morning vital signs show orthostatic blood 

pressures. Will start him on IV fluids and continue monitoring orthostats.








Reason For Visit: 


SYNCOPE








Physical Exam


Vital Signs: 


                                        











Temp Pulse Resp BP Pulse Ox


 


 97.9 F   85   16   134/93 H  98 


 


 03/29/19 07:33  03/29/19 07:33  03/29/19 07:33  03/29/19 07:33  03/29/19 07:33








                                 Intake & Output











 03/28/19 03/29/19 03/30/19





 06:59 06:59 06:59


 


Output Total  0 


 


Balance  0 


 


Weight  168 lb 13.985 oz 











General appearance: PRESENT: no acute distress, well-developed, well-nourished


Eye exam: PRESENT: conjunctiva pink, EOMI, PERRLA.  ABSENT: scleral icterus


Ear exam: PRESENT: normal external ear exam


Mouth exam: PRESENT: moist, tongue midline


Neck exam: ABSENT: carotid bruit, JVD, lymphadenopathy, thyromegaly


Respiratory exam: PRESENT: clear to auscultation reggie.  ABSENT: rales, rhonchi, 

wheezes


Cardiovascular exam: PRESENT: RRR.  ABSENT: diastolic murmur, rubs, systolic 

murmur


Pulses: PRESENT: normal dorsalis pedis pul


GI/Abdominal exam: PRESENT: normal bowel sounds, soft.  ABSENT: distended, 

guarding, mass, organolmegaly, rebound, tenderness


Rectal exam: PRESENT: deferred


Neurological exam: PRESENT: alert, awake, oriented to person, oriented to place,

oriented to time, oriented to situation, CN II-XII grossly intact.  ABSENT: 

motor sensory deficit





Results


Laboratory Results: 


                                        





                                 03/28/19 18:06 





                                 03/28/19 18:06 





                                        











  03/28/19 03/28/19 03/28/19





  18:06 18:06 18:06


 


WBC  8.8  


 


RBC  6.06 H  


 


Hgb  17.7 H  


 


Hct  51.1 H  


 


MCV  84  


 


MCH  29.2  


 


MCHC  34.6  


 


RDW  14.0  


 


Plt Count  325  


 


Seg Neutrophils %  79.6 H  


 


Lymphocytes %  12.3 L  


 


Monocytes %  6.7  


 


Eosinophils %  0.3  


 


Basophils %  1.1  


 


Absolute Neutrophils  7.0  


 


Absolute Lymphocytes  1.1  


 


Absolute Monocytes  0.6  


 


Absolute Eosinophils  0.0  


 


Absolute Basophils  0.1  


 


Sodium   136.9 L 


 


Potassium   3.5 L 


 


Chloride   101 


 


Carbon Dioxide   20 L 


 


Anion Gap   16 


 


BUN   10 


 


Creatinine   1.23 


 


Est GFR ( Amer)   > 60 


 


Est GFR (Non-Af Amer)   > 60 


 


Glucose   93 


 


Calcium   9.9 


 


Magnesium    2.0


 


Total Bilirubin   1.0 


 


AST   32 


 


ALT   27 


 


Alkaline Phosphatase   180 H 


 


Total Protein   7.2 


 


Albumin   4.3 








                                        











  03/28/19 03/28/19 03/28/19





  18:06 18:06 23:20


 


Creatine Kinase  115  


 


CK-MB (CK-2)   3.25 


 


Troponin I   < 0.012  < 0.012














  03/29/19 03/29/19





  05:07 11:38


 


Creatine Kinase  


 


CK-MB (CK-2)  


 


Troponin I  < 0.012  < 0.012











Impressions: 


                                        





Cervical Spine CT  03/28/19 18:22


IMPRESSION:  CHRONIC DEGENERATIVE CHANGES.  NO ACUTE FINDINGS.


 








Chest X-Ray  03/28/19 18:22


IMPRESSION:  NO ACUTE RADIOGRAPHIC FINDING IN THE CHEST.


 








Head CT  03/28/19 18:22


IMPRESSION:  NORMAL BRAIN CT WITHOUT CONTRAST.


EVIDENCE OF ACUTE STROKE: NO.


 














Assessment and Plan





- Diagnosis


(1) Syncope


Qualifiers: 


   Encounter type: initial encounter 


Is this a current diagnosis for this admission?: Yes   


Plan: 


Likely from orthostasis with polpyharmacy contributing. He says his PCP has 

actuall been cutting down on his Klonopin and other psych meds.








(2) Asthma


Is this a current diagnosis for this admission?: Yes   


Plan: 


Not in exacerbation. Breathing treatments prn.

## 2019-03-30 VITALS — SYSTOLIC BLOOD PRESSURE: 160 MMHG | DIASTOLIC BLOOD PRESSURE: 98 MMHG

## 2019-03-30 LAB
ADD MANUAL DIFF: NO
ALBUMIN SERPL-MCNC: 3.2 G/DL (ref 3.5–5)
ALP SERPL-CCNC: 153 U/L (ref 38–126)
ALT SERPL-CCNC: 26 U/L (ref 21–72)
ANION GAP SERPL CALC-SCNC: 6 MMOL/L (ref 5–19)
AST SERPL-CCNC: 26 U/L (ref 17–59)
BASOPHILS # BLD AUTO: 0.1 10^3/UL (ref 0–0.2)
BASOPHILS NFR BLD AUTO: 1 % (ref 0–2)
BILIRUB DIRECT SERPL-MCNC: 0.3 MG/DL (ref 0–0.4)
BILIRUB SERPL-MCNC: 0.8 MG/DL (ref 0.2–1.3)
BUN SERPL-MCNC: 12 MG/DL (ref 7–20)
CALCIUM: 9.3 MG/DL (ref 8.4–10.2)
CHLORIDE SERPL-SCNC: 110 MMOL/L (ref 98–107)
CO2 SERPL-SCNC: 25 MMOL/L (ref 22–30)
EOSINOPHIL # BLD AUTO: 0.1 10^3/UL (ref 0–0.6)
EOSINOPHIL NFR BLD AUTO: 1.1 % (ref 0–6)
ERYTHROCYTE [DISTWIDTH] IN BLOOD BY AUTOMATED COUNT: 14.2 % (ref 11.5–14)
GLUCOSE SERPL-MCNC: 121 MG/DL (ref 75–110)
HCT VFR BLD CALC: 43.6 % (ref 37.9–51)
HGB BLD-MCNC: 15.2 G/DL (ref 13.5–17)
LYMPHOCYTES # BLD AUTO: 0.9 10^3/UL (ref 0.5–4.7)
LYMPHOCYTES NFR BLD AUTO: 17.3 % (ref 13–45)
MCH RBC QN AUTO: 29.4 PG (ref 27–33.4)
MCHC RBC AUTO-ENTMCNC: 34.9 G/DL (ref 32–36)
MCV RBC AUTO: 84 FL (ref 80–97)
MONOCYTES # BLD AUTO: 0.4 10^3/UL (ref 0.1–1.4)
MONOCYTES NFR BLD AUTO: 8 % (ref 3–13)
NEUTROPHILS # BLD AUTO: 3.7 10^3/UL (ref 1.7–8.2)
NEUTS SEG NFR BLD AUTO: 72.6 % (ref 42–78)
PLATELET # BLD: 190 10^3/UL (ref 150–450)
POTASSIUM SERPL-SCNC: 3.7 MMOL/L (ref 3.6–5)
PROT SERPL-MCNC: 5.5 G/DL (ref 6.3–8.2)
RBC # BLD AUTO: 5.18 10^6/UL (ref 4.35–5.55)
SODIUM SERPL-SCNC: 140.9 MMOL/L (ref 137–145)
TOTAL CELLS COUNTED % (AUTO): 100 %
WBC # BLD AUTO: 5.1 10^3/UL (ref 4–10.5)

## 2019-03-30 RX ADMIN — HEPARIN SODIUM SCH: 5000 INJECTION, SOLUTION INTRAVENOUS; SUBCUTANEOUS at 05:12

## 2019-03-30 RX ADMIN — SODIUM CHLORIDE PRN MLS/HR: 9 INJECTION, SOLUTION INTRAVENOUS at 00:48

## 2019-03-30 RX ADMIN — HEPARIN SODIUM SCH: 5000 INJECTION, SOLUTION INTRAVENOUS; SUBCUTANEOUS at 14:40

## 2019-03-30 RX ADMIN — SODIUM CHLORIDE PRN MLS/HR: 9 INJECTION, SOLUTION INTRAVENOUS at 12:02

## 2019-03-30 NOTE — PDOC PROGRESS REPORT
Subjective


Progress Note for:: 03/30/19


Subjective:: 


This is a 58 yr old male with a PMH of asthma, dyslipidemia, essential tremor, 

anxiety, depression and polypharmacy who preented with fall and syncope. He was 

admitted for a syncope work-up. He also sustained laceration on the head form 

the fall.





This morning, he says he says he feels dizzy and lightheaded when he walks. His 

morning vital signs show orthostatic blood pressures. Will start him on IV 

fluids and continue monitoring orthostats.





3/30: No acute event overnight. He is still mildly orthostatic but this has 

significantly improved with IV fluids. He says his dizziness has also improved. 

Denies chest pain or SOB.


Reason For Visit: 


SYNCOPE








Physical Exam


Vital Signs: 


                                        











Temp Pulse Resp BP Pulse Ox


 


 97.8 F   85   16   160/98 H  99 


 


 03/30/19 07:00  03/30/19 07:00  03/30/19 07:00  03/30/19 07:00  03/30/19 07:00








                                 Intake & Output











 03/29/19 03/30/19 03/31/19





 06:59 06:59 06:59


 


Intake Total  1827 1000


 


Output Total 0 1525 


 


Balance 0 302 1000


 


Weight 168 lb 13.985 oz 172 lb 13.478 oz 











General appearance: PRESENT: no acute distress, well-developed, well-nourished


Head exam: PRESENT: atraumatic, normocephalic


Eye exam: PRESENT: conjunctiva pink, EOMI, PERRLA.  ABSENT: scleral icterus


Ear exam: PRESENT: normal external ear exam


Mouth exam: PRESENT: moist, tongue midline


Neck exam: ABSENT: carotid bruit, JVD, lymphadenopathy, thyromegaly


Respiratory exam: PRESENT: clear to auscultation reggie.  ABSENT: rales, rhonchi, 

wheezes


Cardiovascular exam: PRESENT: RRR.  ABSENT: diastolic murmur, rubs, systolic 

murmur


Pulses: PRESENT: normal dorsalis pedis pul


GI/Abdominal exam: PRESENT: normal bowel sounds, soft.  ABSENT: distended, 

guarding, mass, organolmegaly, rebound, tenderness


Rectal exam: PRESENT: deferred


Neurological exam: PRESENT: alert, awake, oriented to person, oriented to place,

oriented to time, oriented to situation, CN II-XII grossly intact.  ABSENT: 

motor sensory deficit





Results


Laboratory Results: 


                                        





                                 03/28/19 18:06 





                                 03/28/19 18:06 





                                        











  03/28/19 03/28/19 03/28/19





  18:06 18:06 23:20


 


Creatine Kinase  115  


 


CK-MB (CK-2)   3.25 


 


Troponin I   < 0.012  < 0.012














  03/29/19 03/29/19





  05:07 11:38


 


Creatine Kinase  


 


CK-MB (CK-2)  


 


Troponin I  < 0.012  < 0.012











Impressions: 


                                        





Cervical Spine CT  03/28/19 18:22


IMPRESSION:  CHRONIC DEGENERATIVE CHANGES.  NO ACUTE FINDINGS.


 








Chest X-Ray  03/28/19 18:22


IMPRESSION:  NO ACUTE RADIOGRAPHIC FINDING IN THE CHEST.


 








Head CT  03/28/19 18:22


IMPRESSION:  NORMAL BRAIN CT WITHOUT CONTRAST.


EVIDENCE OF ACUTE STROKE: NO.


 








Carotid Doppler Study  03/29/19 09:19


IMPRESSION:  NO HEMODYNAMICALLY SIGNIFICANT STENOSIS.


 














Assessment and Plan





- Diagnosis


(1) Syncope


Qualifiers: 


   Encounter type: initial encounter 


Is this a current diagnosis for this admission?: Yes   


Plan: 


Likely from orthostasis with polpyharmacy contributing. He says his PCP has 

actually been cutting down on his Klonopin and other psych meds.





3/30: Improving with IV fluids. Klonopin and lisinopril-HCTZ on hold. Resume 

venlafaxine and trazodone. Carotid doppler is unremarkable. Will switch to 

lisinopril-HCTZ to lopressor. 








(2) Asthma


Is this a current diagnosis for this admission?: Yes   


Plan: 


Not in exacerbation. Breathing treatments prn.








- Time


Time Spent with patient: 15-24 minutes

## 2019-03-30 NOTE — PDOC DISCHARGE SUMMARY
General





- Admit/Disc Date/PCP


Admission Date/Primary Care Provider: 


  03/28/19 22:03





  [facility]





Discharge Date: 03/30/19





- Discharge Diagnosis


(1) Syncope


Is this a current diagnosis for this admission?: Yes   





(2) Asthma


Is this a current diagnosis for this admission?: Yes   





- Additional Information


Resuscitation Status: Full Code


Prescriptions: 


Metoprolol Tartrate [Lopressor 50 mg Tablet] 50 mg PO Q12H #60 tablet


Home Medications: 








Esomeprazole Magnesium [Nexium] 40 mg PO BID 03/29/19 


Omega-3 Fatty Acids/Fish Oil [Fish Oil 1,000 mg Capsule] 1 cap PO DAILY 03/29/19




Topiramate [Topamax] 50 mg PO BID 03/29/19 


Trazodone HCl [Desyrel] 100 mg PO HSP PRN 03/29/19 


Venlafaxine HCl ER [Effexor Xr 75 mg Cap.sr] 225 mg PO DAILY 03/29/19 


Metoprolol Tartrate [Lopressor 50 mg Tablet] 50 mg PO Q12H #60 tablet 03/30/19 











History of Present Illness


History of Present Illness: 


This is a 58 yr old male with a PMH of asthma, dyslipidemia, essential tremor, 

anxiety, depression and polypharmacy who presented with fall and syncope. He was

admitted for a syncope work-up. He also sustained laceration on the head form 

the fall.








Hospital Course


Hospital Course: 


This is a 58 yr old male with a PMH of asthma, dyslipidemia, essential tremor, 

anxiety, depression and polypharmacy who presented with fall and syncope. He was

admitted for a syncope work-up. He also sustained laceration on the head form th e fall.





3/29: This morning, he says he says he feels dizzy and lightheaded when he 

walks. His syncope work-up was remarkable for positive orthostasis. Carotid 

doppler was negative.





He was started on IV fluids which did improve his orthostasis and dizziness. His

antihypertensives were also switched to lopressor. Also recommended to 

discontinue Klonopin on discharge and he says this was already recently d/robert by

his PCP due to him being on multiple medications. He was also recommended to 

minimize using sildenafil.








Physical Exam


Vital Signs: 


                                        











Temp Pulse Resp BP Pulse Ox


 


 98.3 F   88   16   166/94 H  99 


 


 03/30/19 15:31  03/30/19 15:31  03/30/19 15:31  03/30/19 15:31  03/30/19 15:31








                                 Intake & Output











 03/29/19 03/30/19 03/31/19





 06:59 06:59 06:59


 


Intake Total  1827 2840


 


Output Total 0 1525 800


 


Balance 0 302 2040


 


Weight 168 lb 13.985 oz 172 lb 13.478 oz 











General appearance: PRESENT: no acute distress, well-developed, well-nourished


Head exam: PRESENT: atraumatic, normocephalic


Eye exam: PRESENT: conjunctiva pink, EOMI, PERRLA.  ABSENT: scleral icterus


Ear exam: PRESENT: normal external ear exam


Mouth exam: PRESENT: moist, tongue midline


Neck exam: ABSENT: carotid bruit, JVD, lymphadenopathy, thyromegaly


Respiratory exam: PRESENT: clear to auscultation reggie.  ABSENT: rales, rhonchi, 

wheezes


Cardiovascular exam: PRESENT: RRR.  ABSENT: diastolic murmur, rubs, systolic 

murmur


Pulses: PRESENT: normal dorsalis pedis pul


GI/Abdominal exam: PRESENT: normal bowel sounds, soft.  ABSENT: distended, 

guarding, mass, organolmegaly, rebound, tenderness


Rectal exam: PRESENT: deferred


Neurological exam: PRESENT: alert, awake, oriented to person, oriented to place,

oriented to time, oriented to situation, CN II-XII grossly intact.  ABSENT: 

motor sensory deficit





Results


Laboratory Results: 


                                        





                                 03/30/19 13:35 





                                 03/30/19 13:35 





                                        











  03/30/19 03/30/19





  13:35 13:35


 


WBC  5.1 


 


RBC  5.18 


 


Hgb  15.2  D 


 


Hct  43.6 


 


MCV  84 


 


MCH  29.4 


 


MCHC  34.9 


 


RDW  14.2 H 


 


Plt Count  190 


 


Seg Neutrophils %  72.6 


 


Lymphocytes %  17.3 


 


Monocytes %  8.0 


 


Eosinophils %  1.1 


 


Basophils %  1.0 


 


Absolute Neutrophils  3.7 


 


Absolute Lymphocytes  0.9 


 


Absolute Monocytes  0.4 


 


Absolute Eosinophils  0.1 


 


Absolute Basophils  0.1 


 


Sodium   140.9


 


Potassium   3.7


 


Chloride   110 H


 


Carbon Dioxide   25


 


Anion Gap   6


 


BUN   12


 


Creatinine   0.97


 


Est GFR ( Amer)   > 60


 


Est GFR (Non-Af Amer)   > 60


 


Glucose   121 H


 


Calcium   9.3


 


Total Bilirubin   0.8


 


AST   26


 


ALT   26


 


Alkaline Phosphatase   153 H


 


Total Protein   5.5 L


 


Albumin   3.2 L








                                        











  03/28/19 03/28/19 03/28/19





  18:06 18:06 23:20


 


Creatine Kinase  115  


 


CK-MB (CK-2)   3.25 


 


Troponin I   < 0.012  < 0.012














  03/29/19 03/29/19





  05:07 11:38


 


Creatine Kinase  


 


CK-MB (CK-2)  


 


Troponin I  < 0.012  < 0.012











Impressions: 


                                        





Cervical Spine CT  03/28/19 18:22


IMPRESSION:  CHRONIC DEGENERATIVE CHANGES.  NO ACUTE FINDINGS.


 








Chest X-Ray  03/28/19 18:22


IMPRESSION:  NO ACUTE RADIOGRAPHIC FINDING IN THE CHEST.


 








Head CT  03/28/19 18:22


IMPRESSION:  NORMAL BRAIN CT WITHOUT CONTRAST.


EVIDENCE OF ACUTE STROKE: NO.


 








Carotid Doppler Study  03/29/19 09:19


IMPRESSION:  NO HEMODYNAMICALLY SIGNIFICANT STENOSIS.


 














Qualifiers





- *


PATIENT BEING DISCHARGED WITH ANY OF THE FOLLOWING DIAGNOSIS: No